# Patient Record
Sex: MALE | Race: BLACK OR AFRICAN AMERICAN | Employment: UNEMPLOYED | URBAN - METROPOLITAN AREA
[De-identification: names, ages, dates, MRNs, and addresses within clinical notes are randomized per-mention and may not be internally consistent; named-entity substitution may affect disease eponyms.]

---
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
## 2023-11-08 ENCOUNTER — OFFICE VISIT (OUTPATIENT)
Age: 23
End: 2023-11-08

## 2023-11-08 VITALS
HEART RATE: 70 BPM | TEMPERATURE: 97.9 F | DIASTOLIC BLOOD PRESSURE: 85 MMHG | RESPIRATION RATE: 19 BRPM | SYSTOLIC BLOOD PRESSURE: 136 MMHG | OXYGEN SATURATION: 97 % | WEIGHT: 181 LBS

## 2023-11-08 DIAGNOSIS — J06.9 VIRAL URI: Primary | ICD-10-CM

## 2023-11-08 DIAGNOSIS — J02.9 SORE THROAT: ICD-10-CM

## 2023-11-08 DIAGNOSIS — R68.83 CHILLS: ICD-10-CM

## 2023-11-08 LAB
INFLUENZA A ANTIGEN, POC: NEGATIVE
INFLUENZA B ANTIGEN, POC: NEGATIVE
STREP PYOGENES DNA, POC: NEGATIVE
VALID INTERNAL CONTROL, POC: YES
VALID INTERNAL CONTROL, POC: YES

## 2023-11-08 NOTE — PATIENT INSTRUCTIONS
Flu and strep negative, negative home COVID test  Likely viral upper respiratory infection  No evidence of any bacterial infection at this time  Symptomatic treatment: Tylenol/Ibuprofen for fevers/aches/pains, hot tea with honey, zinc throat lozenges, saline sinus rinses  Return if symptoms persist or worsen